# Patient Record
Sex: MALE | Race: WHITE | NOT HISPANIC OR LATINO | URBAN - METROPOLITAN AREA
[De-identification: names, ages, dates, MRNs, and addresses within clinical notes are randomized per-mention and may not be internally consistent; named-entity substitution may affect disease eponyms.]

---

## 2024-03-01 ENCOUNTER — EMERGENCY (EMERGENCY)
Facility: HOSPITAL | Age: 34
LOS: 1 days | Discharge: HOME | End: 2024-03-01
Attending: EMERGENCY MEDICINE | Admitting: EMERGENCY MEDICINE
Payer: COMMERCIAL

## 2024-03-01 VITALS
OXYGEN SATURATION: 99 % | HEIGHT: 70 IN | HEART RATE: 109 BPM | RESPIRATION RATE: 20 BRPM | WEIGHT: 164.91 LBS | SYSTOLIC BLOOD PRESSURE: 125 MMHG | TEMPERATURE: 98 F | DIASTOLIC BLOOD PRESSURE: 80 MMHG

## 2024-03-01 VITALS
RESPIRATION RATE: 18 BRPM | OXYGEN SATURATION: 98 % | DIASTOLIC BLOOD PRESSURE: 76 MMHG | TEMPERATURE: 100 F | HEART RATE: 91 BPM | SYSTOLIC BLOOD PRESSURE: 122 MMHG

## 2024-03-01 DIAGNOSIS — R00.0 TACHYCARDIA, UNSPECIFIED: ICD-10-CM

## 2024-03-01 DIAGNOSIS — Z20.822 CONTACT WITH AND (SUSPECTED) EXPOSURE TO COVID-19: ICD-10-CM

## 2024-03-01 DIAGNOSIS — B34.9 VIRAL INFECTION, UNSPECIFIED: ICD-10-CM

## 2024-03-01 DIAGNOSIS — R21 RASH AND OTHER NONSPECIFIC SKIN ERUPTION: ICD-10-CM

## 2024-03-01 DIAGNOSIS — B08.4 ENTEROVIRAL VESICULAR STOMATITIS WITH EXANTHEM: ICD-10-CM

## 2024-03-01 LAB
ALBUMIN SERPL ELPH-MCNC: 4.5 G/DL — SIGNIFICANT CHANGE UP (ref 3.3–5)
ALP SERPL-CCNC: 54 U/L — SIGNIFICANT CHANGE UP (ref 40–120)
ALT FLD-CCNC: 18 U/L — SIGNIFICANT CHANGE UP (ref 10–45)
ANION GAP SERPL CALC-SCNC: 11 MMOL/L — SIGNIFICANT CHANGE UP (ref 5–17)
AST SERPL-CCNC: 19 U/L — SIGNIFICANT CHANGE UP (ref 10–40)
BASOPHILS # BLD AUTO: 0.07 K/UL — SIGNIFICANT CHANGE UP (ref 0–0.2)
BASOPHILS NFR BLD AUTO: 0.7 % — SIGNIFICANT CHANGE UP (ref 0–2)
BILIRUB SERPL-MCNC: 0.5 MG/DL — SIGNIFICANT CHANGE UP (ref 0.2–1.2)
BUN SERPL-MCNC: 13 MG/DL — SIGNIFICANT CHANGE UP (ref 7–23)
CALCIUM SERPL-MCNC: 9.6 MG/DL — SIGNIFICANT CHANGE UP (ref 8.4–10.5)
CHLORIDE SERPL-SCNC: 100 MMOL/L — SIGNIFICANT CHANGE UP (ref 96–108)
CO2 SERPL-SCNC: 28 MMOL/L — SIGNIFICANT CHANGE UP (ref 22–31)
CREAT SERPL-MCNC: 0.96 MG/DL — SIGNIFICANT CHANGE UP (ref 0.5–1.3)
EGFR: 108 ML/MIN/1.73M2 — SIGNIFICANT CHANGE UP
EOSINOPHIL # BLD AUTO: 0.02 K/UL — SIGNIFICANT CHANGE UP (ref 0–0.5)
EOSINOPHIL NFR BLD AUTO: 0.2 % — SIGNIFICANT CHANGE UP (ref 0–6)
FLUAV AG NPH QL: SIGNIFICANT CHANGE UP
FLUBV AG NPH QL: SIGNIFICANT CHANGE UP
GLUCOSE SERPL-MCNC: 112 MG/DL — HIGH (ref 70–99)
HCT VFR BLD CALC: 40.6 % — SIGNIFICANT CHANGE UP (ref 39–50)
HETEROPH AB TITR SER AGGL: NEGATIVE — SIGNIFICANT CHANGE UP
HGB BLD-MCNC: 14.2 G/DL — SIGNIFICANT CHANGE UP (ref 13–17)
HIV 1+2 AB+HIV1 P24 AG SERPL QL IA: SIGNIFICANT CHANGE UP
IMM GRANULOCYTES NFR BLD AUTO: 0.4 % — SIGNIFICANT CHANGE UP (ref 0–0.9)
LYMPHOCYTES # BLD AUTO: 0.87 K/UL — LOW (ref 1–3.3)
LYMPHOCYTES # BLD AUTO: 8.6 % — LOW (ref 13–44)
MCHC RBC-ENTMCNC: 30.3 PG — SIGNIFICANT CHANGE UP (ref 27–34)
MCHC RBC-ENTMCNC: 35 GM/DL — SIGNIFICANT CHANGE UP (ref 32–36)
MCV RBC AUTO: 86.8 FL — SIGNIFICANT CHANGE UP (ref 80–100)
MONOCYTES # BLD AUTO: 0.63 K/UL — SIGNIFICANT CHANGE UP (ref 0–0.9)
MONOCYTES NFR BLD AUTO: 6.3 % — SIGNIFICANT CHANGE UP (ref 2–14)
NEUTROPHILS # BLD AUTO: 8.45 K/UL — HIGH (ref 1.8–7.4)
NEUTROPHILS NFR BLD AUTO: 83.8 % — HIGH (ref 43–77)
NRBC # BLD: 0 /100 WBCS — SIGNIFICANT CHANGE UP (ref 0–0)
PLATELET # BLD AUTO: 296 K/UL — SIGNIFICANT CHANGE UP (ref 150–400)
POTASSIUM SERPL-MCNC: 3.6 MMOL/L — SIGNIFICANT CHANGE UP (ref 3.5–5.3)
POTASSIUM SERPL-SCNC: 3.6 MMOL/L — SIGNIFICANT CHANGE UP (ref 3.5–5.3)
PROT SERPL-MCNC: 7.8 G/DL — SIGNIFICANT CHANGE UP (ref 6–8.3)
RBC # BLD: 4.68 M/UL — SIGNIFICANT CHANGE UP (ref 4.2–5.8)
RBC # FLD: 10.9 % — SIGNIFICANT CHANGE UP (ref 10.3–14.5)
RSV RNA NPH QL NAA+NON-PROBE: SIGNIFICANT CHANGE UP
S PYO AG SPEC QL IA: NEGATIVE — SIGNIFICANT CHANGE UP
SARS-COV-2 RNA SPEC QL NAA+PROBE: SIGNIFICANT CHANGE UP
SODIUM SERPL-SCNC: 139 MMOL/L — SIGNIFICANT CHANGE UP (ref 135–145)
T PALLIDUM AB TITR SER: NEGATIVE — SIGNIFICANT CHANGE UP
WBC # BLD: 10.08 K/UL — SIGNIFICANT CHANGE UP (ref 3.8–10.5)
WBC # FLD AUTO: 10.08 K/UL — SIGNIFICANT CHANGE UP (ref 3.8–10.5)

## 2024-03-01 PROCEDURE — 87389 HIV-1 AG W/HIV-1&-2 AB AG IA: CPT

## 2024-03-01 PROCEDURE — 96374 THER/PROPH/DIAG INJ IV PUSH: CPT

## 2024-03-01 PROCEDURE — 99284 EMERGENCY DEPT VISIT MOD MDM: CPT | Mod: 25

## 2024-03-01 PROCEDURE — 85025 COMPLETE CBC W/AUTO DIFF WBC: CPT

## 2024-03-01 PROCEDURE — 80053 COMPREHEN METABOLIC PANEL: CPT

## 2024-03-01 PROCEDURE — 86780 TREPONEMA PALLIDUM: CPT

## 2024-03-01 PROCEDURE — 36415 COLL VENOUS BLD VENIPUNCTURE: CPT

## 2024-03-01 PROCEDURE — 96375 TX/PRO/DX INJ NEW DRUG ADDON: CPT

## 2024-03-01 PROCEDURE — 86308 HETEROPHILE ANTIBODY SCREEN: CPT

## 2024-03-01 PROCEDURE — 87081 CULTURE SCREEN ONLY: CPT

## 2024-03-01 PROCEDURE — 87880 STREP A ASSAY W/OPTIC: CPT

## 2024-03-01 PROCEDURE — 87637 SARSCOV2&INF A&B&RSV AMP PRB: CPT

## 2024-03-01 PROCEDURE — 99284 EMERGENCY DEPT VISIT MOD MDM: CPT

## 2024-03-01 RX ORDER — SODIUM CHLORIDE 9 MG/ML
1000 INJECTION INTRAMUSCULAR; INTRAVENOUS; SUBCUTANEOUS ONCE
Refills: 0 | Status: COMPLETED | OUTPATIENT
Start: 2024-03-01 | End: 2024-03-01

## 2024-03-01 RX ORDER — KETOROLAC TROMETHAMINE 30 MG/ML
15 SYRINGE (ML) INJECTION ONCE
Refills: 0 | Status: DISCONTINUED | OUTPATIENT
Start: 2024-03-01 | End: 2024-03-01

## 2024-03-01 RX ORDER — DIPHENHYDRAMINE HCL 50 MG
25 CAPSULE ORAL ONCE
Refills: 0 | Status: COMPLETED | OUTPATIENT
Start: 2024-03-01 | End: 2024-03-01

## 2024-03-01 RX ADMIN — SODIUM CHLORIDE 1000 MILLILITER(S): 9 INJECTION INTRAMUSCULAR; INTRAVENOUS; SUBCUTANEOUS at 09:42

## 2024-03-01 RX ADMIN — Medication 15 MILLIGRAM(S): at 09:43

## 2024-03-01 RX ADMIN — Medication 25 MILLIGRAM(S): at 09:43

## 2024-03-01 NOTE — ED PROVIDER NOTE - CLINICAL SUMMARY MEDICAL DECISION MAKING FREE TEXT BOX
viral syndrome x 2 weeks w sore throat/ fever/ aches/ headache/ diarrhea/ nausea. started amox 1 wk ago for possible sinusitis and medrol dose pack yesterday for continued symptoms. now w diffuse blanching rash. suspect drug reaction vs possible viral exanthem. suspect amoxicillin but also just started medrol. well appearing. neck supple, no meningeal signs. labs done to eval mono, strep, flu/covid, hiv, syphilis. everything neg but rpr pending, throat culture pending. given benadryl with improvement in rash. to continue. stop other meds. return precautions discussed

## 2024-03-01 NOTE — ED PROVIDER NOTE - PATIENT PORTAL LINK FT
You can access the FollowMyHealth Patient Portal offered by [facility] by registering at the following website: [email or web address]. By joining [facility]’s FollowMyHealth portal, you will also be able to view your health information using other applications (apps) compatible with our system.

## 2024-03-01 NOTE — ED PROVIDER NOTE - SKIN, MLM
Skin normal color for race, warm, dry and intact. diffuse blotchy erythematous rash on drunk/extremities, blanching. sparing palms. no vesicles. no purpura or petechiae. warm.

## 2024-03-01 NOTE — ED ADULT NURSE NOTE - NSFALLUNIVINTERV_ED_ALL_ED
Bed/Stretcher in lowest position, wheels locked, appropriate side rails in place/Call bell, personal items and telephone in reach/Instruct patient to call for assistance before getting out of bed/chair/stretcher/Non-slip footwear applied when patient is off stretcher/[place] to call system/Physically safe environment - no spills, clutter or unnecessary equipment/Purposeful proactive rounding/Room/bathroom lighting operational, light cord in reach

## 2024-03-01 NOTE — ED ADULT NURSE REASSESSMENT NOTE - NS ED NURSE REASSESS COMMENT FT1
Pt stable, rash remains, discharge paperwork reviewed, pt to take benadryl q6h for the next 3 days. Vital signs stable. No signs of difficulty breathing or facial swelling. IV removed.

## 2024-03-01 NOTE — ED ADULT NURSE NOTE - CHPI ED NUR SYMPTOMS NEG
no body aches/no chills/no confusion/no decreased eating/drinking/no petechia/no scaly patches on skin/no vomiting

## 2024-03-01 NOTE — ED ADULT NURSE NOTE - CHIEF COMPLAINT QUOTE
32 y/o male c/o fever, headache for about a week, seen at an urgent care and treated for sinusitis with amoxicillin, started on methylprednisolone yesterday and then developed rash, pt endorses palpitations and chest discomfort.

## 2024-03-01 NOTE — ED ADULT TRIAGE NOTE - CHIEF COMPLAINT QUOTE
34 y/o male c/o fever, headache for about a week, seen at an urgent care and treated for sinusitis with amoxicillin, started on methylprednisolone yesterday and then developed rash, pt endorses palpitations and chest discomfort.

## 2024-03-01 NOTE — ED PROVIDER NOTE - ENMT, MLM
Airway patent, Nasal mucosa clear. Mouth with normal mucosa. neck supple. no lad. mild posterior pharynx erythema. no exudates.

## 2024-03-01 NOTE — ED ADULT NURSE NOTE - OBJECTIVE STATEMENT
Pt presents to the ED d/t flu like symptoms and rash. Pt went to  for symptoms, was given methylprednisolone, soon after, developed whole body, non raised, red rash. Pt reports PMH of pancreatitis. Denies dizziness, headache, SOB, difficulty swallowing, chest pain, abdominal pain, urinary symptoms, n/v/d. Reports mild sore throat and itchiness to arms and legs. On assessment pt is A+Ox4, conversing in fulls sentences, equal and unlabored respirations, no cough or wheezing noted. Abdomen soft and non tender. Skin is red, hot to the touch, no raised blisters/patches noted.

## 2024-03-01 NOTE — ED PROVIDER NOTE - OBJECTIVE STATEMENT
denies pmh, here feeling sick for past 2 weeks. Sore throat/ congestion/ headache/ fevers. Had a tele visit last week and prescribed Amoxicillin on Saturday for suspected sinus infection which he had been taking along with tylenol. Today woke with rash on body, went to urgent care and prescribed solumedrol, sent to ED. Notes girlfriend who he lives with also sick recently with bad sore throat. Denies other sexual partners. Also with nausea/ diarrhea. No urinary symptoms. denies pmh, here feeling sick for past 2 weeks. Sore throat/ congestion/ headache/ fevers. Had a tele visit last week and prescribed Amoxicillin on Saturday for suspected sinus infection which he had been taking along with tylenol. Went to urgent care, was prescribed solumedrol which he started yesterday. Today woke with rash on body. Notes girlfriend who he lives with also sick recently with bad sore throat. Denies other sexual partners. Also with nausea/ diarrhea. No urinary symptoms.

## 2024-03-01 NOTE — ED PROVIDER NOTE - NSFOLLOWUPINSTRUCTIONS_ED_ALL_ED_FT
It is unclear if your rash is an allergic reaction to amoxicillin or medrol or related to viral illness. Stop the antibiotic and medrol and take benadryl 25 mg every 6 hours next 3 days. Please see your primary care provider for followup.  Call for appointment.  If you have any problems with followup, please call the ED Referral Coordinator at [phone].  Return to the ER if symptoms worsen or other concerns.    Viral Syndrome    WHAT YOU NEED TO KNOW:    Viral syndrome is a term used for symptoms of an infection caused by a virus. Viruses are spread easily from person to person through the air and on shared items. An illness caused by a virus usually goes away in 10 to 14 days without treatment. Antibiotics are not given for a viral infection.     DISCHARGE INSTRUCTIONS:    Call 911 for the following:     You have a seizure.       You cannot be woken.       You have chest pain or trouble breathing.     Return to the emergency department if:     You have a stiff neck, a bad headache, and sensitivity to light.       You feel weak, dizzy, or confused.       You stop urinating or urinate a lot less than normal.       You cough up blood or thick, yellow or green, mucus.       You have severe abdominal pain or your abdomen is larger than usual.     Contact your healthcare provider if:     Your symptoms do not get better with treatment, or get worse, after 3 days.       You have a rash or ear pain.       You have burning when you urinate.       You have questions or concerns about your condition or care.    Medicines: You may need any of the following:     Acetaminophen decreases pain and fever. It is available without a doctor's order. Ask how much medicine to take and how often to take it. Follow directions. Acetaminophen can cause liver damage if not taken correctly.       NSAIDs, such as ibuprofen, help decrease swelling, pain, and fever. NSAIDs can cause stomach bleeding or kidney problems in certain people. If you take blood thinner medicine, always ask your healthcare provider if NSAIDs are safe for you. Always read the medicine label and follow directions.      Cold medicine helps decrease swelling, control a cough, and relieve chest or nasal congestion.       Saline nasal spray helps decrease nasal congestion.       Take your medicine as directed. Contact your healthcare provider if you think your medicine is not helping or if you have side effects. Tell him of her if you are allergic to any medicine. Keep a list of the medicines, vitamins, and herbs you take. Include the amounts, and when and why you take them. Bring the list or the pill bottles to follow-up visits. Carry your medicine list with you in case of an emergency.    Manage your symptoms:     Drink liquids as directed to prevent dehydration. Ask how much liquid to drink each day and which liquids are best for you. Ask if you should drink an oral rehydration solution (ORS). An ORS has the right amounts of water, salts, and sugar you need to replace body fluids. This may help prevent dehydration caused by vomiting or diarrhea. Do not drink liquids with caffeine. Drinks with caffeine can make dehydration worse.       Get plenty of rest to help your body heal. Take naps throughout the day. Ask your healthcare provider when you can return to work and your normal activities.       Use a cool mist humidifier to help you breathe easier if you have nasal or chest congestion. Ask your healthcare provider how to use a cool mist humidifier.       Eat honey or use cough drops to help decrease throat discomfort. Ask your healthcare provider how much honey you should eat each day. Cough drops are available without a doctor's order. Follow directions for taking cough drops.       Do not smoke and stay away from others who smoke. Nicotine and other chemicals in cigarettes and cigars can cause lung damage. Smoking can also delay healing. Ask your healthcare provider for information if you currently smoke and need help to quit. E-cigarettes or smokeless tobacco still contain nicotine. Talk to your healthcare provider before you use these products.       Wash your hands frequently to prevent the spread of germs to others. Use soap and water. Use gel hand  when soap and water are not available. Wash your hands after you use the bathroom, cough, or sneeze. Wash your hands before you prepare or eat food.     Follow up with your healthcare provider as directed: Write down your questions so you remember to ask them during your visits.    Rash    A rash is a change in the color of the skin. A rash can also change the way your skin feels. There are many different conditions and factors that can cause a rash, most of which are not dangerous. Make sure to follow up with your primary care physician or a dermatologist as instructed by your health care provider.    SEEK IMMEDIATE MEDICAL CARE IF YOU HAVE ANY OF THE FOLLOWING SYMPTOMS: fever, blisters, a rash inside your mouth, vaginal or anal pain, or altered mental status.

## 2024-03-01 NOTE — ED ADULT NURSE NOTE - NS_SISCREENINGSR_GEN_ALL_ED
Dizziness    Dizziness is a common problem. It is a feeling of unsteadiness or light-headedness. You may feel like you are about to faint. This condition can be caused by a number of things, including medicines, dehydration, or illness. Drink enough fluid to keep your urine clear or pale yellow. Do not drink alcohol and limit your caffeine and salt intake. Avoid quick movement.  Rise slowly from chairs and steady yourself until you feel okay. In the morning, first sit up on the side of the bed.    SEEK IMMEDIATE MEDICAL CARE IF YOU HAVE THE FOLLOWING SYMPTOMS: vomiting, changes in your vision or speech, weakness in your arms or legs, trouble speaking or swallowing, chest pain, abdominal pain, shortness of breath, sweating, bleeding, headache, neck pain, or fever.
Negative

## 2024-03-01 NOTE — ED PROVIDER NOTE - NSFOLLOWUPCLINICS_GEN_ALL_ED_FT
[facility]  Family Medicine  [address]  [place], [place] [address]  Phone: [phone]  Fax: